# Patient Record
Sex: MALE
[De-identification: names, ages, dates, MRNs, and addresses within clinical notes are randomized per-mention and may not be internally consistent; named-entity substitution may affect disease eponyms.]

---

## 2021-08-02 PROBLEM — Z00.00 ENCOUNTER FOR PREVENTIVE HEALTH EXAMINATION: Status: ACTIVE | Noted: 2021-08-02

## 2021-08-03 ENCOUNTER — NON-APPOINTMENT (OUTPATIENT)
Age: 56
End: 2021-08-03

## 2021-08-06 ENCOUNTER — NON-APPOINTMENT (OUTPATIENT)
Age: 56
End: 2021-08-06

## 2021-08-06 ENCOUNTER — APPOINTMENT (OUTPATIENT)
Dept: ORTHOPEDIC SURGERY | Facility: CLINIC | Age: 56
End: 2021-08-06
Payer: MEDICARE

## 2021-08-06 VITALS
WEIGHT: 255 LBS | BODY MASS INDEX: 33.8 KG/M2 | SYSTOLIC BLOOD PRESSURE: 153 MMHG | HEART RATE: 91 BPM | HEIGHT: 73 IN | DIASTOLIC BLOOD PRESSURE: 107 MMHG

## 2021-08-06 DIAGNOSIS — M54.9 DORSALGIA, UNSPECIFIED: ICD-10-CM

## 2021-08-06 DIAGNOSIS — M54.2 CERVICALGIA: ICD-10-CM

## 2021-08-06 PROCEDURE — 99204 OFFICE O/P NEW MOD 45 MIN: CPT

## 2021-08-06 PROCEDURE — 72050 X-RAY EXAM NECK SPINE 4/5VWS: CPT

## 2021-08-06 NOTE — HISTORY OF PRESENT ILLNESS
[de-identified] : This is a 56-year-old male here today for evaluation of his neck pain and right arm pain.  He has been dealing with the symptoms for several months.  Unfortunately have been worsening.  He describes neck pain and numbness and tingling over his lateral arm into his forearm and into his hand.  In particular he is dealing with thumb and index finger numbness.  He has tried a variety of nonoperative management.  This includes physical therapy injections but unfortunately he has not had significant pain relief with these medications.  He describes his pain is neck pain equal to arm pain.  He denies any balance problems.  He denies any issues with hand dexterity.  He denies any bowel bladder issues.  He denies any saddle anesthesia.

## 2021-08-06 NOTE — PHYSICAL EXAM
[de-identified] : Cervical Physical Exam\par \par Gait - Normal\par \par Station - Normal\par \par Sagittal balance - Normal \par \par Compensatory mechanism? - None\par \par Horizontal gaze - Maintained\par \par Heel walk - Normal\par \par Toe walk - Normal\par \par Reflexes\par Biceps - Normal\par Triceps - Normal\par Brachioradialis - Normal\par Patellar - Normal\par Gastroc - Normal\par Clonus -No\par \par Robins´s - None\par \par Shoulder Exam - Normal\par \par Spurling´s - None\par \par Wrist Pulses -2+ radial/ulnar\par \par Foot Pulses -2+ DP/PT\par \par Cervical range of motion - Normal\par \par Sensation \par C5-T1 sensation intact to light touch bilaterally, but does have numbness over her right index finger middle finger\par \par L1-S1 sensation intact to light touch bilaterally\par \par Motor\par \par \par 	Deltoid	Biceps	Triceps	WF	WE	IO	\par Right	5/5	5/5	5/5	5/5	5/5	5/5	5/5\par Left	5/5	5/5	5/5	5/5	5/5	5/5	5/5\par \par \par 	IP	Quad	HS	TA	Gastroc	EHL\par Right	5/5	5/5	5/5	5/5	5/5	5/5\par Left	5/5	5/5	5/5	5/5	5/5	5/5 [de-identified] : Cervical radiographs\par Cervical spondylosis noted\par Severe disc height loss at C4-C5 and C5-C6\par No instability on flexion-extension radiographs\par \par Cervical MRI\par There is foraminal stenosis on the right at C4-C5 and C5-C6\par I do note central stenosis at C4-C5

## 2024-03-14 ENCOUNTER — APPOINTMENT (OUTPATIENT)
Dept: PULMONOLOGY | Facility: CLINIC | Age: 59
End: 2024-03-14
Payer: MEDICARE

## 2024-03-14 VITALS
SYSTOLIC BLOOD PRESSURE: 144 MMHG | OXYGEN SATURATION: 99 % | BODY MASS INDEX: 33.18 KG/M2 | HEART RATE: 91 BPM | HEIGHT: 72 IN | TEMPERATURE: 97.2 F | DIASTOLIC BLOOD PRESSURE: 90 MMHG | WEIGHT: 245 LBS

## 2024-03-14 DIAGNOSIS — Z78.9 OTHER SPECIFIED HEALTH STATUS: ICD-10-CM

## 2024-03-14 DIAGNOSIS — G47.9 SLEEP DISORDER, UNSPECIFIED: ICD-10-CM

## 2024-03-14 DIAGNOSIS — F17.200 NICOTINE DEPENDENCE, UNSPECIFIED, UNCOMPLICATED: ICD-10-CM

## 2024-03-14 DIAGNOSIS — R05.9 COUGH, UNSPECIFIED: ICD-10-CM

## 2024-03-14 PROCEDURE — 99203 OFFICE O/P NEW LOW 30 MIN: CPT | Mod: 25

## 2024-03-14 PROCEDURE — 94727 GAS DIL/WSHOT DETER LNG VOL: CPT

## 2024-03-14 PROCEDURE — 94060 EVALUATION OF WHEEZING: CPT

## 2024-03-14 PROCEDURE — 99406 BEHAV CHNG SMOKING 3-10 MIN: CPT

## 2024-03-14 PROCEDURE — 94729 DIFFUSING CAPACITY: CPT

## 2024-03-14 RX ORDER — OXYCODONE AND ACETAMINOPHEN 5; 325 MG/1; MG/1
5-325 TABLET ORAL
Refills: 0 | Status: ACTIVE | COMMUNITY

## 2024-03-14 RX ORDER — OMEPRAZOLE 40 MG/1
40 CAPSULE, DELAYED RELEASE ORAL
Refills: 0 | Status: ACTIVE | COMMUNITY

## 2024-03-14 RX ORDER — AMLODIPINE BESYLATE 10 MG/1
10 TABLET ORAL
Refills: 0 | Status: ACTIVE | COMMUNITY

## 2024-03-14 RX ORDER — ALBUTEROL SULFATE 90 UG/1
108 (90 BASE) INHALANT RESPIRATORY (INHALATION)
Qty: 1 | Refills: 3 | Status: ACTIVE | COMMUNITY
Start: 2024-03-14 | End: 1900-01-01

## 2024-03-14 NOTE — HISTORY OF PRESENT ILLNESS
[Current] : current [>= 20 pack years] : >= 20 pack years [Daytime Somnolence] : daytime somnolence [Awakes Unrefreshed] : awakes unrefreshed [Fatigue] : fatigue [Frequent Nocturnal Awakening] : frequent nocturnal awakening [Snoring] : snoring [TextBox_4] : 58-year-old male presents for evaluation of possible sleep apnea.  The patient snores but is unaware of apneic episodes.  He has fragmented sleep with frequent awakenings.  He complains of daytime somnolence and fatigue.  He denies sedative hypnotic med use but does drink alcohol on occasion.  He has a 20-pack-year history of smoking, presently smoking half pack daily.  He was told of having "COPD" by his PMD presently on no treatment however.  He denies cough, chest pain, shortness of breath or wheezing. [TextBox_11] : 3/4 [TextBox_13] : 30

## 2024-03-14 NOTE — PHYSICAL EXAM
[Normal Oropharynx] : normal oropharynx [No Acute Distress] : no acute distress [Normal Appearance] : normal appearance [Normal Rate/Rhythm] : normal rate/rhythm [No Neck Mass] : no neck mass [Normal S1, S2] : normal s1, s2 [No Murmurs] : no murmurs [No Resp Distress] : no resp distress [Clear to Auscultation Bilaterally] : clear to auscultation bilaterally [No Abnormalities] : no abnormalities [Benign] : benign [Normal Gait] : normal gait [No Clubbing] : no clubbing [No Cyanosis] : no cyanosis [No Edema] : no edema [FROM] : FROM [Normal Color/ Pigmentation] : normal color/ pigmentation [No Focal Deficits] : no focal deficits [Normal Affect] : normal affect [Oriented x3] : oriented x3

## 2024-03-14 NOTE — DISCUSSION/SUMMARY
[FreeTextEntry1] : 58-year-old female with stable pulmonary exam.  I reviewed the PFT results with the patient.  Smoking cessation was stressed.  I also discussed sleep apnea with the patient.  PSG will be performed.  He is to avoid sedative hypnotic meds and excessive alcohol intake.  Diet and weight loss were also recommended.  He is to follow-up with his PMD as before.

## 2024-03-14 NOTE — COUNSELING
[Encouraged to pick a quit date and identify support needed to quit] : Encouraged to pick a quit date and identify support needed to quit [Yes] : Willing to quit smoking [FreeTextEntry1] : 10

## 2024-07-18 DIAGNOSIS — G47.30 SLEEP APNEA, UNSPECIFIED: ICD-10-CM

## 2024-09-13 ENCOUNTER — APPOINTMENT (OUTPATIENT)
Dept: PULMONOLOGY | Facility: CLINIC | Age: 59
End: 2024-09-13
Payer: MEDICARE

## 2024-09-13 VITALS
OXYGEN SATURATION: 99 % | DIASTOLIC BLOOD PRESSURE: 86 MMHG | WEIGHT: 248 LBS | SYSTOLIC BLOOD PRESSURE: 118 MMHG | BODY MASS INDEX: 33.64 KG/M2 | TEMPERATURE: 97 F

## 2024-09-13 PROCEDURE — 99214 OFFICE O/P EST MOD 30 MIN: CPT | Mod: 25

## 2024-09-13 PROCEDURE — 99406 BEHAV CHNG SMOKING 3-10 MIN: CPT

## 2024-09-14 NOTE — HISTORY OF PRESENT ILLNESS
[Current] : current [>= 20 pack years] : >= 20 pack years [TextBox_4] : 59-year-old male with history of HANNAH on CPAP via full facemask, presents for follow-up.  The patient is compliant with CPAP use.  He denies sleep-related complaints.  He continues to smoke, now down to 8 cigarettes daily ,complaining of ORTEZ which he states is due to back and leg pain.  He does not use any inhalers. [TextBox_11] : 3/4 [TextBox_13] : 30 [TextBox_29] : Denies snoring, daytime somnolence, apneic episodes, AM headaches

## 2024-09-14 NOTE — DISCUSSION/SUMMARY
[FreeTextEntry1] : 59-year-old male with history of HANNAH stable with CPAP use via full facemask.  Continue compliance with treatment was stressed.  Smoking cessation was discussed with the patient at length.  Low-dose screening chest CT will be performed.  He is to follow-up with his PMD as before and for the influenza vaccine.

## 2024-10-30 ENCOUNTER — APPOINTMENT (OUTPATIENT)
Dept: GASTROENTEROLOGY | Facility: CLINIC | Age: 59
End: 2024-10-30
Payer: MEDICARE

## 2024-10-30 VITALS
DIASTOLIC BLOOD PRESSURE: 80 MMHG | RESPIRATION RATE: 14 BRPM | HEART RATE: 102 BPM | HEIGHT: 72 IN | SYSTOLIC BLOOD PRESSURE: 130 MMHG | WEIGHT: 256 LBS | BODY MASS INDEX: 34.67 KG/M2 | TEMPERATURE: 98 F | OXYGEN SATURATION: 99 %

## 2024-10-30 DIAGNOSIS — M54.2 CERVICALGIA: ICD-10-CM

## 2024-10-30 DIAGNOSIS — Z12.11 ENCOUNTER FOR SCREENING FOR MALIGNANT NEOPLASM OF COLON: ICD-10-CM

## 2024-10-30 DIAGNOSIS — G47.30 SLEEP APNEA, UNSPECIFIED: ICD-10-CM

## 2024-10-30 PROCEDURE — 99203 OFFICE O/P NEW LOW 30 MIN: CPT

## 2024-10-30 RX ORDER — SODIUM SULFATE, POTASSIUM SULFATE AND MAGNESIUM SULFATE 1.6; 3.13; 17.5 G/177ML; G/177ML; G/177ML
17.5-3.13-1.6 SOLUTION ORAL TWICE DAILY
Qty: 2 | Refills: 0 | Status: ACTIVE | COMMUNITY
Start: 2024-10-30 | End: 1900-01-01

## 2025-03-13 ENCOUNTER — APPOINTMENT (OUTPATIENT)
Dept: PULMONOLOGY | Facility: CLINIC | Age: 60
End: 2025-03-13
Payer: MEDICARE

## 2025-03-13 ENCOUNTER — NON-APPOINTMENT (OUTPATIENT)
Age: 60
End: 2025-03-13

## 2025-03-13 VITALS
HEART RATE: 106 BPM | DIASTOLIC BLOOD PRESSURE: 86 MMHG | WEIGHT: 248 LBS | OXYGEN SATURATION: 97 % | HEIGHT: 72 IN | TEMPERATURE: 97 F | SYSTOLIC BLOOD PRESSURE: 128 MMHG | BODY MASS INDEX: 33.59 KG/M2

## 2025-03-13 PROCEDURE — 99214 OFFICE O/P EST MOD 30 MIN: CPT | Mod: 25

## 2025-03-13 PROCEDURE — 99406 BEHAV CHNG SMOKING 3-10 MIN: CPT

## 2025-03-17 ENCOUNTER — APPOINTMENT (OUTPATIENT)
Dept: GASTROENTEROLOGY | Facility: CLINIC | Age: 60
End: 2025-03-17
Payer: MEDICARE

## 2025-03-17 VITALS
OXYGEN SATURATION: 98 % | RESPIRATION RATE: 14 BRPM | WEIGHT: 247 LBS | HEIGHT: 72 IN | DIASTOLIC BLOOD PRESSURE: 78 MMHG | HEART RATE: 89 BPM | SYSTOLIC BLOOD PRESSURE: 110 MMHG | BODY MASS INDEX: 33.46 KG/M2

## 2025-03-17 DIAGNOSIS — G47.9 SLEEP DISORDER, UNSPECIFIED: ICD-10-CM

## 2025-03-17 DIAGNOSIS — F17.200 NICOTINE DEPENDENCE, UNSPECIFIED, UNCOMPLICATED: ICD-10-CM

## 2025-03-17 DIAGNOSIS — Z12.11 ENCOUNTER FOR SCREENING FOR MALIGNANT NEOPLASM OF COLON: ICD-10-CM

## 2025-03-17 DIAGNOSIS — G47.30 SLEEP APNEA, UNSPECIFIED: ICD-10-CM

## 2025-03-17 PROCEDURE — 99213 OFFICE O/P EST LOW 20 MIN: CPT

## 2025-03-17 RX ORDER — SODIUM SULFATE, POTASSIUM SULFATE AND MAGNESIUM SULFATE 1.6; 3.13; 17.5 G/177ML; G/177ML; G/177ML
17.5-3.13-1.6 SOLUTION ORAL
Qty: 354 | Refills: 0 | Status: ACTIVE | COMMUNITY
Start: 2025-03-17 | End: 1900-01-01

## 2025-04-01 ENCOUNTER — APPOINTMENT (OUTPATIENT)
Dept: GASTROENTEROLOGY | Facility: AMBULATORY SURGERY CENTER | Age: 60
End: 2025-04-01
Payer: MEDICARE

## 2025-04-01 PROCEDURE — 45378 DIAGNOSTIC COLONOSCOPY: CPT

## 2025-09-11 ENCOUNTER — APPOINTMENT (OUTPATIENT)
Dept: PULMONOLOGY | Facility: CLINIC | Age: 60
End: 2025-09-11